# Patient Record
Sex: MALE | Race: BLACK OR AFRICAN AMERICAN | NOT HISPANIC OR LATINO | Employment: OTHER | URBAN - METROPOLITAN AREA
[De-identification: names, ages, dates, MRNs, and addresses within clinical notes are randomized per-mention and may not be internally consistent; named-entity substitution may affect disease eponyms.]

---

## 2020-11-15 ENCOUNTER — HOSPITAL ENCOUNTER (EMERGENCY)
Facility: HOSPITAL | Age: 65
Discharge: HOME | End: 2020-11-15
Attending: EMERGENCY MEDICINE | Admitting: EMERGENCY MEDICINE

## 2020-11-15 ENCOUNTER — APPOINTMENT (OUTPATIENT)
Dept: CT IMAGING | Facility: HOSPITAL | Age: 65
End: 2020-11-15

## 2020-11-15 VITALS
HEART RATE: 92 BPM | BODY MASS INDEX: 33.18 KG/M2 | WEIGHT: 245 LBS | RESPIRATION RATE: 19 BRPM | DIASTOLIC BLOOD PRESSURE: 94 MMHG | TEMPERATURE: 97.7 F | HEIGHT: 72 IN | SYSTOLIC BLOOD PRESSURE: 126 MMHG | OXYGEN SATURATION: 97 %

## 2020-11-15 DIAGNOSIS — R10.9 RIGHT SIDED ABDOMINAL PAIN: Primary | ICD-10-CM

## 2020-11-15 DIAGNOSIS — Z87.19 HISTORY OF HIATAL HERNIA: ICD-10-CM

## 2020-11-15 DIAGNOSIS — K92.1 HEMATOCHEZIA: ICD-10-CM

## 2020-11-15 DIAGNOSIS — E87.1 HYPONATREMIA: ICD-10-CM

## 2020-11-15 DIAGNOSIS — Z79.82 CURRENT USE OF ASPIRIN: ICD-10-CM

## 2020-11-15 DIAGNOSIS — Z87.19 HISTORY OF GASTROESOPHAGEAL REFLUX (GERD): ICD-10-CM

## 2020-11-15 DIAGNOSIS — Z86.79 HISTORY OF HYPERTENSION: ICD-10-CM

## 2020-11-15 LAB
ALBUMIN SERPL-MCNC: 4.4 G/DL (ref 3.5–5.2)
ALBUMIN/GLOB SERPL: 1.5 G/DL
ALP SERPL-CCNC: 81 U/L (ref 39–117)
ALT SERPL W P-5'-P-CCNC: 38 U/L (ref 1–41)
ANION GAP SERPL CALCULATED.3IONS-SCNC: 13 MMOL/L (ref 5–15)
AST SERPL-CCNC: 25 U/L (ref 1–40)
BASOPHILS # BLD AUTO: 0.11 10*3/MM3 (ref 0–0.2)
BASOPHILS NFR BLD AUTO: 1.1 % (ref 0–1.5)
BILIRUB SERPL-MCNC: 0.4 MG/DL (ref 0–1.2)
BILIRUB UR QL STRIP: NEGATIVE
BUN SERPL-MCNC: 9 MG/DL (ref 8–23)
BUN/CREAT SERPL: 12.9 (ref 7–25)
CALCIUM SPEC-SCNC: 9.3 MG/DL (ref 8.6–10.5)
CHLORIDE SERPL-SCNC: 90 MMOL/L (ref 98–107)
CLARITY UR: CLEAR
CO2 SERPL-SCNC: 24 MMOL/L (ref 22–29)
COLOR UR: YELLOW
CREAT SERPL-MCNC: 0.7 MG/DL (ref 0.76–1.27)
D-LACTATE SERPL-SCNC: 1.7 MMOL/L (ref 0.5–2)
DEPRECATED RDW RBC AUTO: 40.3 FL (ref 37–54)
DEVELOPER EXPIRATION DATE: NORMAL
DEVELOPER LOT NUMBER: NORMAL
EOSINOPHIL # BLD AUTO: 0.14 10*3/MM3 (ref 0–0.4)
EOSINOPHIL NFR BLD AUTO: 1.3 % (ref 0.3–6.2)
ERYTHROCYTE [DISTWIDTH] IN BLOOD BY AUTOMATED COUNT: 12 % (ref 12.3–15.4)
EXPIRATION DATE: NORMAL
FECAL OCCULT BLOOD SCREEN, POC: NEGATIVE
GFR SERPL CREATININE-BSD FRML MDRD: 138 ML/MIN/1.73
GLOBULIN UR ELPH-MCNC: 2.9 GM/DL
GLUCOSE SERPL-MCNC: 134 MG/DL (ref 65–99)
GLUCOSE UR STRIP-MCNC: NEGATIVE MG/DL
HCT VFR BLD AUTO: 47 % (ref 37.5–51)
HGB BLD-MCNC: 17.1 G/DL (ref 13–17.7)
HGB UR QL STRIP.AUTO: NEGATIVE
HOLD SPECIMEN: NORMAL
HOLD SPECIMEN: NORMAL
IMM GRANULOCYTES # BLD AUTO: 0.06 10*3/MM3 (ref 0–0.05)
IMM GRANULOCYTES NFR BLD AUTO: 0.6 % (ref 0–0.5)
KETONES UR QL STRIP: ABNORMAL
LEUKOCYTE ESTERASE UR QL STRIP.AUTO: NEGATIVE
LIPASE SERPL-CCNC: 23 U/L (ref 13–60)
LYMPHOCYTES # BLD AUTO: 1.54 10*3/MM3 (ref 0.7–3.1)
LYMPHOCYTES NFR BLD AUTO: 14.8 % (ref 19.6–45.3)
Lab: NORMAL
MCH RBC QN AUTO: 33.2 PG (ref 26.6–33)
MCHC RBC AUTO-ENTMCNC: 36.4 G/DL (ref 31.5–35.7)
MCV RBC AUTO: 91.3 FL (ref 79–97)
MONOCYTES # BLD AUTO: 0.7 10*3/MM3 (ref 0.1–0.9)
MONOCYTES NFR BLD AUTO: 6.7 % (ref 5–12)
NEGATIVE CONTROL: NEGATIVE
NEUTROPHILS NFR BLD AUTO: 7.85 10*3/MM3 (ref 1.7–7)
NEUTROPHILS NFR BLD AUTO: 75.5 % (ref 42.7–76)
NITRITE UR QL STRIP: NEGATIVE
NRBC BLD AUTO-RTO: 0 /100 WBC (ref 0–0.2)
PH UR STRIP.AUTO: 6 [PH] (ref 5–8)
PLATELET # BLD AUTO: 344 10*3/MM3 (ref 140–450)
PMV BLD AUTO: 9.4 FL (ref 6–12)
POSITIVE CONTROL: POSITIVE
POTASSIUM SERPL-SCNC: 3.5 MMOL/L (ref 3.5–5.2)
PROT SERPL-MCNC: 7.3 G/DL (ref 6–8.5)
PROT UR QL STRIP: NEGATIVE
RBC # BLD AUTO: 5.15 10*6/MM3 (ref 4.14–5.8)
SODIUM SERPL-SCNC: 127 MMOL/L (ref 136–145)
SP GR UR STRIP: 1.02 (ref 1–1.03)
UROBILINOGEN UR QL STRIP: ABNORMAL
WBC # BLD AUTO: 10.4 10*3/MM3 (ref 3.4–10.8)
WHOLE BLOOD HOLD SPECIMEN: NORMAL
WHOLE BLOOD HOLD SPECIMEN: NORMAL

## 2020-11-15 PROCEDURE — 82270 OCCULT BLOOD FECES: CPT | Performed by: PHYSICIAN ASSISTANT

## 2020-11-15 PROCEDURE — 81003 URINALYSIS AUTO W/O SCOPE: CPT | Performed by: EMERGENCY MEDICINE

## 2020-11-15 PROCEDURE — 85025 COMPLETE CBC W/AUTO DIFF WBC: CPT | Performed by: EMERGENCY MEDICINE

## 2020-11-15 PROCEDURE — 74177 CT ABD & PELVIS W/CONTRAST: CPT

## 2020-11-15 PROCEDURE — 99283 EMERGENCY DEPT VISIT LOW MDM: CPT

## 2020-11-15 PROCEDURE — 83605 ASSAY OF LACTIC ACID: CPT | Performed by: EMERGENCY MEDICINE

## 2020-11-15 PROCEDURE — 80053 COMPREHEN METABOLIC PANEL: CPT | Performed by: EMERGENCY MEDICINE

## 2020-11-15 PROCEDURE — 83690 ASSAY OF LIPASE: CPT | Performed by: EMERGENCY MEDICINE

## 2020-11-15 PROCEDURE — 25010000002 IOPAMIDOL 61 % SOLUTION: Performed by: EMERGENCY MEDICINE

## 2020-11-15 RX ORDER — PANTOPRAZOLE SODIUM 40 MG/1
40 TABLET, DELAYED RELEASE ORAL DAILY
Status: ON HOLD | COMMUNITY
End: 2021-04-08

## 2020-11-15 RX ORDER — METOPROLOL SUCCINATE 50 MG/1
50 TABLET, EXTENDED RELEASE ORAL DAILY
COMMUNITY

## 2020-11-15 RX ORDER — ONDANSETRON 2 MG/ML
4 INJECTION INTRAMUSCULAR; INTRAVENOUS ONCE
Status: DISCONTINUED | OUTPATIENT
Start: 2020-11-15 | End: 2020-11-15 | Stop reason: HOSPADM

## 2020-11-15 RX ORDER — LISINOPRIL 20 MG/1
20 TABLET ORAL 2 TIMES DAILY
Status: ON HOLD | COMMUNITY
End: 2021-04-08

## 2020-11-15 RX ORDER — SODIUM CHLORIDE 9 MG/ML
10 INJECTION INTRAVENOUS AS NEEDED
Status: DISCONTINUED | OUTPATIENT
Start: 2020-11-15 | End: 2020-11-15 | Stop reason: HOSPADM

## 2020-11-15 RX ORDER — DICYCLOMINE HCL 20 MG
20 TABLET ORAL EVERY 6 HOURS PRN
Qty: 21 TABLET | Refills: 0 | Status: ON HOLD | OUTPATIENT
Start: 2020-11-15 | End: 2021-04-08

## 2020-11-15 RX ADMIN — SODIUM CHLORIDE 1000 ML: 9 INJECTION, SOLUTION INTRAVENOUS at 02:21

## 2020-11-15 RX ADMIN — IOPAMIDOL 95 ML: 612 INJECTION, SOLUTION INTRAVENOUS at 03:40

## 2020-11-15 NOTE — DISCHARGE INSTRUCTIONS
ER evaluation revealed normal CBC and normal urinalysis.  Hemoglobin and hematocrit were within normal limits at

## 2020-11-15 NOTE — ED PROVIDER NOTES
Subjective   This is a 65-year-old male that presents to the ER with right-sided abdominal pain that has been persistent for the last 3 days.  Pain is to the right upper quadrant and right lower quadrant.  He describes it as a constant pain with intermittent sharp pains.  There is no radiation.  He reports some bloody bowel movements 3 days ago.  Stool was loose in nature and he described bright red blood, as well as some maroon-colored blood.  He denies any anorexia, nausea/vomiting.  He denies fever or chills.  He denies any dysuria, urgency, or frequency.  Previous abdominal surgeries include hemorrhoid surgery and hydrocele repair.  Last colonoscopy was approximately 10 years ago.  Patient denies history of colon polyps or diverticulosis.  Past medical history is significant for hypertension, hyperlipidemia, and GERD.  Patient says that he was seen and evaluated at [facility] on 11/11/2020.  He was ultimately admitted and said that the hospital physician was for an and he could not understand what he was saying and they could not get along, so he left AGAINST MEDICAL ADVICE.  Patient started having increased pain at home tonight, and since he lives alone, he wanted to be evaluated here in the ER.  He is not on any chronic anticoagulation, but he does take aspirin 81 mg by mouth daily.  He denies any cough, congestion, chest pain, or shortness of breath.  He reports negative Covid testing during recent, brief hospitalization in [place].  Patient says that he has close GI follow-up with Dr. Arcadio Spangler in [place] on Monday, 11/16/2020.      History provided by:  Patient  Abdominal Pain  Pain location:  RUQ and RLQ  Pain quality: sharp    Pain quality comment:  Steady, constant pain with intermittent sharp pains  Pain radiates to:  Does not radiate  Pain severity:  Moderate  Duration:  3 days  Timing:  Constant  Chronicity:  New  Context: previous surgery    Context: not sick contacts  and not suspicious food intake    Context comment:  Recently admitted to [facility] 3 days ago.  He stayed overnight and he and the doctor didn't get along.  He also had no information.  Pt left AMA.  Increased right sided abdominal pain.  Relieved by:  Nothing  Worsened by:  Nothing  Ineffective treatments:  None tried  Associated symptoms: diarrhea and hematochezia (bright red and dark red 3 days ago.  None since.)    Associated symptoms: no anorexia, no chest pain, no chills, no constipation, no cough, no dysuria, no fever, no hematemesis, no hematuria, no melena, no nausea, no shortness of breath and no vomiting    Risk factors: aspirin and multiple surgeries (History of hydrocele repair, hemorrhoid surgery.)    Risk factors comment:  Last colonoscopy 10 years ago; no polyps or diverticulosis.  Pt diagnosed with shingles 2 1/2 weeks ago.      Review of Systems   Constitutional: Negative for appetite change, chills, diaphoresis and fever.   HENT: Negative.  Negative for nosebleeds, postnasal drip, rhinorrhea and sinus pain.         Tested negative for Covid-19 3 days ago at [facility].   Respiratory: Negative.  Negative for cough and shortness of breath.    Cardiovascular: Negative.  Negative for chest pain and leg swelling.   Gastrointestinal: Positive for abdominal pain, blood in stool, diarrhea and hematochezia (bright red and dark red 3 days ago.  None since.). Negative for anorexia, constipation, hematemesis, melena, nausea and vomiting.   Genitourinary: Negative.  Negative for dysuria, flank pain, frequency, hematuria and urgency.   Musculoskeletal: Negative.  Negative for back pain.   Neurological: Negative.  Negative for dizziness, syncope and weakness.   Hematological: Does not bruise/bleed easily.   All other systems reviewed and are negative.      No past medical history on file.    No Known Allergies    No past surgical history on file.    No family history on file.    Social  History     Socioeconomic History   • Marital status: Single     Spouse name: Not on file   • Number of children: Not on file   • Years of education: Not on file   • Highest education level: Not on file           Objective   Physical Exam  Vitals signs and nursing note reviewed.   Constitutional:       Appearance: Normal appearance.   HENT:      Head: Normocephalic and atraumatic.      Right Ear: Tympanic membrane normal.      Left Ear: Tympanic membrane normal.      Nose: Nose normal.      Mouth/Throat:      Mouth: Mucous membranes are moist.      Pharynx: Oropharynx is clear.   Eyes:      Extraocular Movements: Extraocular movements intact.      Conjunctiva/sclera: Conjunctivae normal.      Pupils: Pupils are equal, round, and reactive to light.   Neck:      Musculoskeletal: Normal range of motion and neck supple.   Cardiovascular:      Rate and Rhythm: Normal rate and regular rhythm.      Pulses: Normal pulses.      Heart sounds: Normal heart sounds.   Pulmonary:      Effort: Pulmonary effort is normal. No tachypnea, accessory muscle usage or retractions.      Breath sounds: Normal breath sounds.      Comments: Lungs are clear to auscultation bilaterally.  Abdominal:      General: Bowel sounds are normal. There is no distension.      Palpations: Abdomen is soft.      Tenderness: There is abdominal tenderness in the right upper quadrant and right lower quadrant. There is guarding. There is no right CVA tenderness, left CVA tenderness or rebound.      Comments: Moderate TTP to right mid-abdomen and right lower quadrant.  Involuntary guarding.  No flank or CVA TTP.   Genitourinary:     Rectum: Guaiac result negative. No external hemorrhoid or internal hemorrhoid.      Comments: No gross blood on digital rectal exam.  No external or internal hemorrhoids appreciated.  Musculoskeletal: Normal range of motion.   Skin:     General: Skin is warm and dry.   Neurological:      General: No focal deficit present.      Mental  Status: He is alert.         Procedures           ED Course  ED Course as of Nov 15 0434   Sun Nov 15, 2020   0321 CBC reveals normal H&H at 17 and 47.  Platelet count was normal at 344,000.  Chemistries show sodium level 127, but potassium was normal at 3.5 and all other chemistries were within normal limits.  Lactic acid is 1.7.  Lipase is 23.  Stool guaiac was negative and patient had no evidence of gross blood, internal or external hemorrhoids.  Urinalysis showed trace ketones but no acute infectious process and no microscopic hematuria.    [FC]   0425 CT of the abdomen and pelvis with contrast reveals small hiatal hernia.  Otherwise GI tract is within normal limits, including appendix.  No bladder wall thickening.  No evidence of colitis or diverticulitis.  There is a small fat-containing umbilical hernia as well as small bilateral fat-containing inguinal hernias.  Abdominal exam is benign and nonsurgical.  I updated patient on all results.  We will prescribe Bentyl as needed for abdominal cramping.  Recommend patient to hold his aspirin until close GI follow-up on Monday.  Recommend EGD and colonoscopy in the near future.  Return if any worsening symptoms.  Patient is agreeable with above treatment plan.    [FC]   0426 Sodium was 127.  I gave patient 1 L normal saline bolus and recommend close PCP follow-up for repeat chemistries early next week.    [FC]      ED Course User Index  [FC] Krista Loco PA-C           Recent Results (from the past 24 hour(s))   POC Occult Blood Stool    Collection Time: 11/15/20  2:09 AM    Specimen: Per Rectum; Stool   Result Value Ref Range    Fecal Occult Blood Negative Negative    Lot Number [id number]     Expiration Date 9/23     DEVELOPER LOT NUMBER [id number]     DEVELOPER EXPIRATION DATE 6/24     Positive Control Positive Positive    Negative Control Negative Negative   Urinalysis With Microscopic If Indicated (No Culture) - Urine, Clean Catch    Collection Time: 11/15/20   2:14 AM    Specimen: Urine, Clean Catch   Result Value Ref Range    Color, UA Yellow Yellow, Straw    Appearance, UA Clear Clear    pH, UA 6.0 5.0 - 8.0    Specific Gravity, UA 1.025 1.001 - 1.030    Glucose, UA Negative Negative    Ketones, UA Trace (A) Negative    Bilirubin, UA Negative Negative    Blood, UA Negative Negative    Protein, UA Negative Negative    Leuk Esterase, UA Negative Negative    Nitrite, UA Negative Negative    Urobilinogen, UA 1.0 E.U./dL 0.2 - 1.0 E.U./dL   Light Blue Top    Collection Time: 11/15/20  2:28 AM   Result Value Ref Range    Extra Tube hold for add-on    Lavender Top    Collection Time: 11/15/20  2:28 AM   Result Value Ref Range    Extra Tube hold for add-on    Gold Top - SST    Collection Time: 11/15/20  2:28 AM   Result Value Ref Range    Extra Tube Hold for add-ons.    CBC Auto Differential    Collection Time: 11/15/20  2:28 AM    Specimen: Blood   Result Value Ref Range    WBC 10.40 3.40 - 10.80 10*3/mm3    RBC 5.15 4.14 - 5.80 10*6/mm3    Hemoglobin 17.1 13.0 - 17.7 g/dL    Hematocrit 47.0 37.5 - 51.0 %    MCV 91.3 79.0 - 97.0 fL    MCH 33.2 (H) 26.6 - 33.0 pg    MCHC 36.4 (H) 31.5 - 35.7 g/dL    RDW 12.0 (L) 12.3 - 15.4 %    RDW-SD 40.3 37.0 - 54.0 fl    MPV 9.4 6.0 - 12.0 fL    Platelets 344 140 - 450 10*3/mm3    Neutrophil % 75.5 42.7 - 76.0 %    Lymphocyte % 14.8 (L) 19.6 - 45.3 %    Monocyte % 6.7 5.0 - 12.0 %    Eosinophil % 1.3 0.3 - 6.2 %    Basophil % 1.1 0.0 - 1.5 %    Immature Grans % 0.6 (H) 0.0 - 0.5 %    Neutrophils, Absolute 7.85 (H) 1.70 - 7.00 10*3/mm3    Lymphocytes, Absolute 1.54 0.70 - 3.10 10*3/mm3    Monocytes, Absolute 0.70 0.10 - 0.90 10*3/mm3    Eosinophils, Absolute 0.14 0.00 - 0.40 10*3/mm3    Basophils, Absolute 0.11 0.00 - 0.20 10*3/mm3    Immature Grans, Absolute 0.06 (H) 0.00 - 0.05 10*3/mm3    nRBC 0.0 0.0 - 0.2 /100 WBC   Comprehensive Metabolic Panel    Collection Time: 11/15/20  2:29 AM    Specimen: Blood   Result Value Ref Range    Glucose  134 (H) 65 - 99 mg/dL    BUN 9 8 - 23 mg/dL    Creatinine 0.70 (L) 0.76 - 1.27 mg/dL    Sodium 127 (L) 136 - 145 mmol/L    Potassium 3.5 3.5 - 5.2 mmol/L    Chloride 90 (L) 98 - 107 mmol/L    CO2 24.0 22.0 - 29.0 mmol/L    Calcium 9.3 8.6 - 10.5 mg/dL    Total Protein 7.3 6.0 - 8.5 g/dL    Albumin 4.40 3.50 - 5.20 g/dL    ALT (SGPT) 38 1 - 41 U/L    AST (SGOT) 25 1 - 40 U/L    Alkaline Phosphatase 81 39 - 117 U/L    Total Bilirubin 0.4 0.0 - 1.2 mg/dL    eGFR  African Amer 138 >60 mL/min/1.73    Globulin 2.9 gm/dL    A/G Ratio 1.5 g/dL    BUN/Creatinine Ratio 12.9 7.0 - 25.0    Anion Gap 13.0 5.0 - 15.0 mmol/L   Lipase    Collection Time: 11/15/20  2:29 AM    Specimen: Blood   Result Value Ref Range    Lipase 23 13 - 60 U/L   Lactic Acid, Plasma    Collection Time: 11/15/20  2:29 AM    Specimen: Blood   Result Value Ref Range    Lactate 1.7 0.5 - 2.0 mmol/L   Green Top (Gel)    Collection Time: 11/15/20  2:29 AM   Result Value Ref Range    Extra Tube Hold for add-ons.      Note: In addition to lab results from this visit, the labs listed above may include labs taken at another facility or during a different encounter within the last 24 hours. Please correlate lab times with ED admission and discharge times for further clarification of the services performed during this visit.    CT Abdomen Pelvis With Contrast   Final Result      1. Small hiatal hernia. Otherwise normal GI tract, including the appendix.   2. No bladder wall thickening identified to suggest cystitis. There is some hypodense material layering dependently in the bladder, possibly some debris. Correlate with laboratory data. Urology follow-up may be needed.   3. Additional nonacute findings as above.               Signer Name: Arie Florian MD    Signed: 11/15/2020 4:03 AM    Workstation Name: [email or web address]     [facility]        Vitals:    11/15/20 0123 11/15/20 0230 11/15/20 0300 11/15/20 0400   BP: (!) 179/103 143/98 149/93  "156/90   BP Location: Left arm      Patient Position: Sitting      Pulse: 92      Resp: 19      Temp: 97.7 °F (36.5 °C)      TempSrc: Infrared      SpO2: 99% 98% 99% 97%   Weight: 111 kg (245 lb)      Height: 182.9 cm (72\")        Medications   Sodium Chloride (PF) 0.9 % 10 mL (has no administration in time range)   ondansetron (ZOFRAN) injection 4 mg (4 mg Intravenous Not Given 11/15/20 0232)   sodium chloride 0.9 % bolus 1,000 mL (0 mL Intravenous Stopped 11/15/20 0406)   iopamidol (ISOVUE-300) 61 % injection 100 mL (95 mL Intravenous Given 11/15/20 0340)     ECG/EMG Results (last 24 hours)     ** No results found for the last 24 hours. **        No orders to display                                       MDM    Final diagnoses:   Right sided abdominal pain   Hematochezia   Current use of aspirin   Hyponatremia   History of hypertension   History of gastroesophageal reflux (GERD)   History of hiatal hernia            Krista Loco PA-C  11/15/20 0684    "

## 2021-04-02 ENCOUNTER — TRANSCRIBE ORDERS (OUTPATIENT)
Dept: ADMINISTRATIVE | Facility: HOSPITAL | Age: 66
End: 2021-04-02

## 2021-04-02 DIAGNOSIS — R94.39 ABNORMAL STRESS TEST: Primary | ICD-10-CM

## 2021-04-06 ENCOUNTER — APPOINTMENT (OUTPATIENT)
Dept: PREADMISSION TESTING | Facility: HOSPITAL | Age: 66
End: 2021-04-06

## 2021-04-06 LAB — SARS-COV-2 RNA NOSE QL NAA+PROBE: NOT DETECTED

## 2021-04-06 PROCEDURE — C9803 HOPD COVID-19 SPEC COLLECT: HCPCS

## 2021-04-06 PROCEDURE — U0004 COV-19 TEST NON-CDC HGH THRU: HCPCS

## 2021-04-08 ENCOUNTER — HOSPITAL ENCOUNTER (OUTPATIENT)
Facility: HOSPITAL | Age: 66
Setting detail: HOSPITAL OUTPATIENT SURGERY
Discharge: HOME | End: 2021-04-08
Attending: INTERNAL MEDICINE | Admitting: INTERNAL MEDICINE

## 2021-04-08 VITALS
HEART RATE: 49 BPM | TEMPERATURE: 98.3 F | HEIGHT: 72 IN | RESPIRATION RATE: 18 BRPM | SYSTOLIC BLOOD PRESSURE: 112 MMHG | WEIGHT: 224.65 LBS | BODY MASS INDEX: 30.43 KG/M2 | DIASTOLIC BLOOD PRESSURE: 77 MMHG | OXYGEN SATURATION: 97 %

## 2021-04-08 DIAGNOSIS — R94.39 ABNORMAL STRESS TEST: ICD-10-CM

## 2021-04-08 LAB
ANION GAP SERPL CALCULATED.3IONS-SCNC: 11 MMOL/L (ref 5–15)
BUN SERPL-MCNC: 8 MG/DL (ref 8–23)
BUN/CREAT SERPL: 10.4 (ref 7–25)
CALCIUM SPEC-SCNC: 9.2 MG/DL (ref 8.6–10.5)
CHLORIDE SERPL-SCNC: 96 MMOL/L (ref 98–107)
CO2 SERPL-SCNC: 28 MMOL/L (ref 22–29)
CREAT SERPL-MCNC: 0.77 MG/DL (ref 0.76–1.27)
DEPRECATED RDW RBC AUTO: 41.8 FL (ref 37–54)
ERYTHROCYTE [DISTWIDTH] IN BLOOD BY AUTOMATED COUNT: 13.2 % (ref 12.3–15.4)
GFR SERPL CREATININE-BSD FRML MDRD: 123 ML/MIN/1.73
GLUCOSE SERPL-MCNC: 94 MG/DL (ref 65–99)
HCT VFR BLD AUTO: 46.8 % (ref 37.5–51)
HGB BLD-MCNC: 16 G/DL (ref 13–17.7)
MCH RBC QN AUTO: 29.7 PG (ref 26.6–33)
MCHC RBC AUTO-ENTMCNC: 34.2 G/DL (ref 31.5–35.7)
MCV RBC AUTO: 86.8 FL (ref 79–97)
PLATELET # BLD AUTO: 260 10*3/MM3 (ref 140–450)
PMV BLD AUTO: 10.5 FL (ref 6–12)
POTASSIUM SERPL-SCNC: 3.4 MMOL/L (ref 3.5–5.2)
RBC # BLD AUTO: 5.39 10*6/MM3 (ref 4.14–5.8)
SODIUM SERPL-SCNC: 135 MMOL/L (ref 136–145)
WBC # BLD AUTO: 9.16 10*3/MM3 (ref 3.4–10.8)

## 2021-04-08 PROCEDURE — C1874 STENT, COATED/COV W/DEL SYS: HCPCS | Performed by: INTERNAL MEDICINE

## 2021-04-08 PROCEDURE — 25010000002 FENTANYL CITRATE (PF) 100 MCG/2ML SOLUTION: Performed by: INTERNAL MEDICINE

## 2021-04-08 PROCEDURE — C1894 INTRO/SHEATH, NON-LASER: HCPCS | Performed by: INTERNAL MEDICINE

## 2021-04-08 PROCEDURE — 0 IOPAMIDOL PER 1 ML: Performed by: INTERNAL MEDICINE

## 2021-04-08 PROCEDURE — C1769 GUIDE WIRE: HCPCS | Performed by: INTERNAL MEDICINE

## 2021-04-08 PROCEDURE — C9600 PERC DRUG-EL COR STENT SING: HCPCS | Performed by: INTERNAL MEDICINE

## 2021-04-08 PROCEDURE — C1887 CATHETER, GUIDING: HCPCS | Performed by: INTERNAL MEDICINE

## 2021-04-08 PROCEDURE — 80048 BASIC METABOLIC PNL TOTAL CA: CPT | Performed by: INTERNAL MEDICINE

## 2021-04-08 PROCEDURE — 92978 ENDOLUMINL IVUS OCT C 1ST: CPT | Performed by: INTERNAL MEDICINE

## 2021-04-08 PROCEDURE — 25010000002 BIVALIRUDIN TRIFLUOROACETATE 250 MG RECONSTITUTED SOLUTION 1 EACH VIAL: Performed by: INTERNAL MEDICINE

## 2021-04-08 PROCEDURE — 25010000002 HEPARIN (PORCINE) PER 1000 UNITS: Performed by: INTERNAL MEDICINE

## 2021-04-08 PROCEDURE — 25010000002 MIDAZOLAM PER 1 MG: Performed by: INTERNAL MEDICINE

## 2021-04-08 PROCEDURE — C1725 CATH, TRANSLUMIN NON-LASER: HCPCS | Performed by: INTERNAL MEDICINE

## 2021-04-08 PROCEDURE — 85027 COMPLETE CBC AUTOMATED: CPT | Performed by: INTERNAL MEDICINE

## 2021-04-08 PROCEDURE — 93005 ELECTROCARDIOGRAM TRACING: CPT | Performed by: INTERNAL MEDICINE

## 2021-04-08 PROCEDURE — 93458 L HRT ARTERY/VENTRICLE ANGIO: CPT | Performed by: INTERNAL MEDICINE

## 2021-04-08 PROCEDURE — C1753 CATH, INTRAVAS ULTRASOUND: HCPCS | Performed by: INTERNAL MEDICINE

## 2021-04-08 DEVICE — XIENCE SIERRA™ EVEROLIMUS ELUTING CORONARY STENT SYSTEM 2.50 MM X 38 MM / RAPID-EXCHANGE
Type: IMPLANTABLE DEVICE | Status: FUNCTIONAL
Brand: XIENCE SIERRA™

## 2021-04-08 DEVICE — XIENCE SIERRA™ EVEROLIMUS ELUTING CORONARY STENT SYSTEM 3.00 MM X 18 MM / RAPID-EXCHANGE
Type: IMPLANTABLE DEVICE | Status: FUNCTIONAL
Brand: XIENCE SIERRA™

## 2021-04-08 RX ORDER — ASPIRIN 325 MG
325 TABLET, DELAYED RELEASE (ENTERIC COATED) ORAL DAILY
Status: DISCONTINUED | OUTPATIENT
Start: 2021-04-08 | End: 2021-04-08 | Stop reason: HOSPADM

## 2021-04-08 RX ORDER — SUCRALFATE 1 G/1
1 TABLET ORAL 4 TIMES DAILY PRN
COMMUNITY

## 2021-04-08 RX ORDER — ACETAMINOPHEN 325 MG/1
650 TABLET ORAL EVERY 4 HOURS PRN
Status: DISCONTINUED | OUTPATIENT
Start: 2021-04-08 | End: 2021-04-08 | Stop reason: HOSPADM

## 2021-04-08 RX ORDER — HYDROCODONE BITARTRATE AND ACETAMINOPHEN 5; 325 MG/1; MG/1
1 TABLET ORAL EVERY 4 HOURS PRN
Status: DISCONTINUED | OUTPATIENT
Start: 2021-04-08 | End: 2021-04-08 | Stop reason: HOSPADM

## 2021-04-08 RX ORDER — ASPIRIN 81 MG/1
81 TABLET ORAL DAILY
COMMUNITY

## 2021-04-08 RX ORDER — NALOXONE HCL 0.4 MG/ML
0.4 VIAL (ML) INJECTION
Status: DISCONTINUED | OUTPATIENT
Start: 2021-04-08 | End: 2021-04-08 | Stop reason: HOSPADM

## 2021-04-08 RX ORDER — NITROGLYCERIN 0.4 MG/1
0.4 TABLET SUBLINGUAL
COMMUNITY

## 2021-04-08 RX ORDER — ALPRAZOLAM 0.25 MG/1
0.25 TABLET ORAL 3 TIMES DAILY PRN
Status: DISCONTINUED | OUTPATIENT
Start: 2021-04-08 | End: 2021-04-08 | Stop reason: HOSPADM

## 2021-04-08 RX ORDER — MIDAZOLAM HYDROCHLORIDE 1 MG/ML
INJECTION INTRAMUSCULAR; INTRAVENOUS AS NEEDED
Status: DISCONTINUED | OUTPATIENT
Start: 2021-04-08 | End: 2021-04-08 | Stop reason: HOSPADM

## 2021-04-08 RX ORDER — LIDOCAINE HYDROCHLORIDE 10 MG/ML
INJECTION, SOLUTION EPIDURAL; INFILTRATION; INTRACAUDAL; PERINEURAL AS NEEDED
Status: DISCONTINUED | OUTPATIENT
Start: 2021-04-08 | End: 2021-04-08 | Stop reason: HOSPADM

## 2021-04-08 RX ORDER — CLOPIDOGREL BISULFATE 75 MG/1
75 TABLET ORAL DAILY
Qty: 30 TABLET | Refills: 11 | Status: SHIPPED | OUTPATIENT
Start: 2021-04-08

## 2021-04-08 RX ORDER — AMLODIPINE BESYLATE 5 MG/1
5 TABLET ORAL DAILY
Status: ON HOLD | COMMUNITY
End: 2021-04-08

## 2021-04-08 RX ORDER — FENTANYL CITRATE 50 UG/ML
INJECTION, SOLUTION INTRAMUSCULAR; INTRAVENOUS AS NEEDED
Status: DISCONTINUED | OUTPATIENT
Start: 2021-04-08 | End: 2021-04-08 | Stop reason: HOSPADM

## 2021-04-08 RX ORDER — FLUTICASONE PROPIONATE 50 MCG
2 SPRAY, SUSPENSION (ML) NASAL AS NEEDED
COMMUNITY

## 2021-04-08 RX ORDER — OMEPRAZOLE 40 MG/1
40 CAPSULE, DELAYED RELEASE ORAL DAILY
COMMUNITY

## 2021-04-08 RX ORDER — CHLORAL HYDRATE 500 MG
1000 CAPSULE ORAL
COMMUNITY

## 2021-04-08 RX ORDER — FAMOTIDINE 20 MG/1
20 TABLET, FILM COATED ORAL 2 TIMES DAILY
COMMUNITY

## 2021-04-08 RX ORDER — MORPHINE SULFATE 2 MG/ML
1 INJECTION, SOLUTION INTRAMUSCULAR; INTRAVENOUS EVERY 4 HOURS PRN
Status: DISCONTINUED | OUTPATIENT
Start: 2021-04-08 | End: 2021-04-08 | Stop reason: HOSPADM

## 2021-04-08 RX ORDER — TEMAZEPAM 7.5 MG/1
7.5 CAPSULE ORAL NIGHTLY PRN
Status: DISCONTINUED | OUTPATIENT
Start: 2021-04-08 | End: 2021-04-08 | Stop reason: HOSPADM

## 2021-04-08 RX ORDER — LISINOPRIL AND HYDROCHLOROTHIAZIDE 20; 12.5 MG/1; MG/1
1 TABLET ORAL 2 TIMES DAILY
COMMUNITY

## 2021-04-08 RX ORDER — ROSUVASTATIN CALCIUM 20 MG/1
20 TABLET, COATED ORAL DAILY
Qty: 30 TABLET | Refills: 11 | Status: SHIPPED | OUTPATIENT
Start: 2021-04-08

## 2021-04-08 RX ORDER — CLOPIDOGREL BISULFATE 75 MG/1
TABLET ORAL AS NEEDED
Status: DISCONTINUED | OUTPATIENT
Start: 2021-04-08 | End: 2021-04-08 | Stop reason: HOSPADM

## 2021-04-08 RX ORDER — LISINOPRIL AND HYDROCHLOROTHIAZIDE 25; 20 MG/1; MG/1
1 TABLET ORAL DAILY
Status: ON HOLD | COMMUNITY
End: 2021-04-08

## 2021-04-08 RX ORDER — FLUTICASONE PROPIONATE AND SALMETEROL 113; 14 UG/1; UG/1
1 POWDER, METERED RESPIRATORY (INHALATION) AS NEEDED
COMMUNITY

## 2021-04-08 RX ADMIN — ASPIRIN 325 MG: 325 TABLET, COATED ORAL at 13:05

## 2021-04-08 NOTE — H&P
Pre-Cardiac Catheterization Report  Cardiovascular Laboratory  [facility]      Patient:  Rocky Hollis  :  1955  PCP:  System, Provider Not In  PHONE:  None    DATE: 2021    BRIEF HPI:  Rocky Hollis is a 66 y.o. male with hypertension, hypercholesterolemia, tobacco abuse, COPD, and a family history of coronary artery disease.  He is complaining of chest pain which he describes as an ache that has been increasing daily for the past few months.  He states it is moderate severity lasting minutes with associated shortness of breath and dyspnea on exertion.  His symptoms increased with stress and are decreased with rest.  He recently underwent an abnormal stress test and now presents for left heart catheterization with possible intervention.  He is in need of surgical clearance for a cholecystectomy.    Cardiac Risk Factors:  advanced age (older than 55 for men, 65 for women), dyslipidemia, family history of premature cardiovascular disease, hypertension, male gender, smoking/ tobacco exposure    Anginal class in last 2 weeks:  CCS class III    CHF Class in last 2 weeks:  NYHA Class II    Cardiogenic shock:  no    Cardiac arrest <24 hours:  no    Stress test within last 6 months:   yes   Details:    Previous cardiac catheterization:  yes  Details:     Previous CABG:  no  Details:      Allergies:     IV contrast allergy:  no  No Known Allergies    MEDICATIONS:  Prior to Admission medications    Medication Sig Start Date End Date Taking? Authorizing Provider   dicyclomine (BENTYL) 20 MG tablet Take 1 tablet by mouth Every 6 (Six) Hours As Needed (abdominal cramping). 11/15/20   Krista Loco PA-C   metoprolol succinate XL (TOPROL-XL) 50 MG 24 hr tablet Take 50 mg by mouth Daily.    ProviderBartolo MD   lisinopril (PRINIVIL,ZESTRIL) 20 MG tablet Take 20 mg by mouth 2 (Two) Times a Day.  21  Bartolo Ivy MD   pantoprazole (PROTONIX) 40 MG EC tablet Take 40 mg by mouth  Daily.  4/8/21  Provider, MD Bartolo       Past medical & surgical history, social and family history reviewed in the electronic medical record.    ROS:  Cardiovascular ROS: positive for - chest pain, dyspnea on exertion and shortness of breath    Physical Exam:    Vitals: There were no vitals filed for this visit. There were no vitals filed for this visit.    General Appearance:    Alert, cooperative, in no acute distress   Head:    Normocephalic, without obvious abnormality, atraumatic   Eyes:            Lids and lashes normal, conjunctivae and sclerae normal, no   icterus, no pallor, corneas clear, PERRLA   Ears:    Ears appear intact with no abnormalities noted   Neck:   No adenopathy, supple, trachea midline, no thyromegaly, no   carotid bruit, no JVD   Back:     No kyphosis present, no scoliosis present, range of motion normal   Lungs:     Clear to auscultation,respirations regular, even and                unlabored    Heart:    Regular rhythm and normal rate, normal S1 and S2, no         murmur, no gallop, no rub, no click   Chest Wall:    No abnormalities observed   Abdomen:     Normal bowel sounds, no masses, no organomegaly, soft     nontender, nondistended, no guarding, no rebound                tenderness   Rectal:     Deferred   Extremities:   Moves all extremities well, no edema, no cyanosis, no           redness   Pulses:  DP and PT pulses not palpable   Skin:   No bleeding, bruising or rash   Neurologic:   Cranial nerves 2 - 12 grossly intact, sensation intact     Barbaeu Test:  Left: Normal  (oxymetric Allens) Right: Not Assessed             Lab Results   Component Value Date    AST 25 11/15/2020    ALT 38 11/15/2020           Impression      · Abnormal stress test    Plan     · Procedure to perform: [facility]  · Planned access: Left radial artery              SAMUEL Mcguire   04/08/21  12:24 EDT

## 2021-04-09 ENCOUNTER — DOCUMENTATION (OUTPATIENT)
Dept: CARDIAC REHAB | Facility: HOSPITAL | Age: 66
End: 2021-04-09

## 2021-04-09 ENCOUNTER — CALL CENTER PROGRAMS (OUTPATIENT)
Dept: CALL CENTER | Facility: HOSPITAL | Age: 66
End: 2021-04-09

## 2021-04-09 NOTE — OUTREACH NOTE
PCI/Device Survey      Responses   Facility patient discharged from?  [facility]   Procedure date  04/08/21   Procedure (if device, specify in description)  PCI   PCI site  Left, Arm   Performing MD Dr. Catrachito Graff   Attempt successful?  Yes   Call start time  1006   Call end time  1008   Is the patient taking prescribed medications:  ASA, Plavix   Nursing intervention  Reminded to continue to take prescribed medications   Nursing intervention  Patient education provided   Does the patient have an appointment scheduled with the cardiologist?  No   Did the patient feel prepared to go home on the same day as the procedure?  Yes   Is the patient satisfied with the same day discharge process?  Yes   PCI/Device call completed  Yes          Lauri Lynn RN

## 2021-04-12 LAB
QT INTERVAL: 438 MS
QTC INTERVAL: 438 MS

## 2023-05-24 NOTE — OUTREACH NOTE
PCI/Device Survey      Responses   Facility patient discharged from?  [facility]   Procedure date  04/08/21   Procedure (if device, specify in description)  PCI   PCI site  Left, Arm   Performing MD Dr. Catrachito Graff   Attempt successful?  No   Unsuccessful attempts  Attempt 1          Lauri Lynn RN   Where Is Your Acne Located?: Face

## (undated) DEVICE — CATH DIAG EXPO .045 AL1 5F 100CM

## (undated) DEVICE — DEV COMP RAD PRELUDESYNC 24CM

## (undated) DEVICE — MODEL AT P65, P/N 701554-001KIT CONTENTS: HAND CONTROLLER, 3-WAY HIGH-PRESSURE STOPCOCK WITH ROTATING END AND PREMIUM HIGH-PRESSURE TUBING: Brand: ANGIOTOUCH® KIT

## (undated) DEVICE — CATH DIAG EXPO .045 MPA2 5F 100CM

## (undated) DEVICE — CATH DIAG EXPO .045 FL3  5F 100CM

## (undated) DEVICE — RUNTHROUGH NS EXTRA FLOPPY PTCA GUIDEWIRE: Brand: RUNTHROUGH

## (undated) DEVICE — GW INQWIRE FC PTFE STD J/1.5 .035 260

## (undated) DEVICE — BALN NC/EUPHORA RX 4.00X08MM

## (undated) DEVICE — MODEL BT2000 P/N 700287-012KIT CONTENTS: MANIFOLD WITH SALINE AND CONTRAST PORTS, SALINE TUBING WITH SPIKE AND HAND SYRINGE, TRANSDUCER: Brand: BT2000 AUTOMATED MANIFOLD KIT

## (undated) DEVICE — ANGIOGRAPHIC CATHETER: Brand: EXPO™

## (undated) DEVICE — PK CATH CARD 10

## (undated) DEVICE — CATH DIAG EXPO M/ PK 5F FL4/FR4 PIG

## (undated) DEVICE — GUIDE CATHETER: Brand: MACH1™

## (undated) DEVICE — INTRO SHEATH PRELUDE IDEAL SPRNG COIL 021 6F 23X80CM

## (undated) DEVICE — CORONARY IMAGING CATHETER: Brand: OPTICROSS™ 6 HD

## (undated) DEVICE — DEV INFL MONARCH 25W